# Patient Record
Sex: FEMALE | Race: WHITE
[De-identification: names, ages, dates, MRNs, and addresses within clinical notes are randomized per-mention and may not be internally consistent; named-entity substitution may affect disease eponyms.]

---
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
## 2021-03-03 LAB
ABO GROUP BLD: NORMAL
BLD GP AB SCN SERPL QL: NORMAL
RH BLD: NORMAL
TREPONEMA PALLIDUM IGG+IGM AB [PRESENCE] IN SERUM OR PLASMA BY IMMUNOASSAY: NONREACTIVE

## 2021-06-09 ENCOUNTER — HOSPITAL ENCOUNTER (INPATIENT)
Facility: MEDICAL CENTER | Age: 21
LOS: 2 days | End: 2021-06-11
Attending: OBSTETRICS & GYNECOLOGY | Admitting: OBSTETRICS & GYNECOLOGY
Payer: COMMERCIAL

## 2021-06-09 LAB
ALBUMIN SERPL BCP-MCNC: 3.4 G/DL (ref 3.2–4.9)
ALBUMIN/GLOB SERPL: 1.3 G/DL
ALP SERPL-CCNC: 179 U/L (ref 30–99)
ALT SERPL-CCNC: 12 U/L (ref 2–50)
ANION GAP SERPL CALC-SCNC: 11 MMOL/L (ref 7–16)
AST SERPL-CCNC: 16 U/L (ref 12–45)
BASOPHILS # BLD AUTO: 0.2 % (ref 0–1.8)
BASOPHILS # BLD: 0.02 K/UL (ref 0–0.12)
BILIRUB SERPL-MCNC: 0.2 MG/DL (ref 0.1–1.5)
BUN SERPL-MCNC: 3 MG/DL (ref 8–22)
CALCIUM SERPL-MCNC: 8.6 MG/DL (ref 8.5–10.5)
CHLORIDE SERPL-SCNC: 107 MMOL/L (ref 96–112)
CO2 SERPL-SCNC: 19 MMOL/L (ref 20–33)
CREAT SERPL-MCNC: 0.35 MG/DL (ref 0.5–1.4)
CREAT UR-MCNC: 24.56 MG/DL
EOSINOPHIL # BLD AUTO: 0.07 K/UL (ref 0–0.51)
EOSINOPHIL NFR BLD: 0.8 % (ref 0–6.9)
ERYTHROCYTE [DISTWIDTH] IN BLOOD BY AUTOMATED COUNT: 46.5 FL (ref 35.9–50)
GLOBULIN SER CALC-MCNC: 2.6 G/DL (ref 1.9–3.5)
GLUCOSE SERPL-MCNC: 70 MG/DL (ref 65–99)
HCT VFR BLD AUTO: 34.6 % (ref 37–47)
HGB BLD-MCNC: 10.6 G/DL (ref 12–16)
HOLDING TUBE BB 8507: NORMAL
IMM GRANULOCYTES # BLD AUTO: 0.05 K/UL (ref 0–0.11)
IMM GRANULOCYTES NFR BLD AUTO: 0.6 % (ref 0–0.9)
LYMPHOCYTES # BLD AUTO: 1.53 K/UL (ref 1–4.8)
LYMPHOCYTES NFR BLD: 17.2 % (ref 22–41)
MCH RBC QN AUTO: 26 PG (ref 27–33)
MCHC RBC AUTO-ENTMCNC: 30.6 G/DL (ref 33.6–35)
MCV RBC AUTO: 85 FL (ref 81.4–97.8)
MONOCYTES # BLD AUTO: 0.7 K/UL (ref 0–0.85)
MONOCYTES NFR BLD AUTO: 7.9 % (ref 0–13.4)
NEUTROPHILS # BLD AUTO: 6.54 K/UL (ref 2–7.15)
NEUTROPHILS NFR BLD: 73.3 % (ref 44–72)
NRBC # BLD AUTO: 0 K/UL
NRBC BLD-RTO: 0 /100 WBC
PLATELET # BLD AUTO: 288 K/UL (ref 164–446)
PMV BLD AUTO: 12.6 FL (ref 9–12.9)
POTASSIUM SERPL-SCNC: 3.9 MMOL/L (ref 3.6–5.5)
PROT SERPL-MCNC: 6 G/DL (ref 6–8.2)
PROT UR-MCNC: 6 MG/DL (ref 0–15)
PROT/CREAT UR: 244 MG/G (ref 10–107)
RBC # BLD AUTO: 4.07 M/UL (ref 4.2–5.4)
SODIUM SERPL-SCNC: 137 MMOL/L (ref 135–145)
URATE SERPL-MCNC: 4 MG/DL (ref 1.9–8.2)
WBC # BLD AUTO: 8.9 K/UL (ref 4.8–10.8)

## 2021-06-09 PROCEDURE — 700105 HCHG RX REV CODE 258: Performed by: OBSTETRICS & GYNECOLOGY

## 2021-06-09 PROCEDURE — 36415 COLL VENOUS BLD VENIPUNCTURE: CPT

## 2021-06-09 PROCEDURE — 80053 COMPREHEN METABOLIC PANEL: CPT

## 2021-06-09 PROCEDURE — A9270 NON-COVERED ITEM OR SERVICE: HCPCS | Performed by: OBSTETRICS & GYNECOLOGY

## 2021-06-09 PROCEDURE — U0003 INFECTIOUS AGENT DETECTION BY NUCLEIC ACID (DNA OR RNA); SEVERE ACUTE RESPIRATORY SYNDROME CORONAVIRUS 2 (SARS-COV-2) (CORONAVIRUS DISEASE [COVID-19]), AMPLIFIED PROBE TECHNIQUE, MAKING USE OF HIGH THROUGHPUT TECHNOLOGIES AS DESCRIBED BY CMS-2020-01-R: HCPCS

## 2021-06-09 PROCEDURE — 302449 STATCHG TRIAGE ONLY (STATISTIC)

## 2021-06-09 PROCEDURE — 85025 COMPLETE CBC W/AUTO DIFF WBC: CPT

## 2021-06-09 PROCEDURE — 770002 HCHG ROOM/CARE - OB PRIVATE (112)

## 2021-06-09 PROCEDURE — C9803 HOPD COVID-19 SPEC COLLECT: HCPCS | Performed by: OBSTETRICS & GYNECOLOGY

## 2021-06-09 PROCEDURE — 59025 FETAL NON-STRESS TEST: CPT

## 2021-06-09 PROCEDURE — 84550 ASSAY OF BLOOD/URIC ACID: CPT

## 2021-06-09 PROCEDURE — U0005 INFEC AGEN DETEC AMPLI PROBE: HCPCS

## 2021-06-09 PROCEDURE — 87400 INFLUENZA A/B EACH AG IA: CPT

## 2021-06-09 PROCEDURE — 82570 ASSAY OF URINE CREATININE: CPT

## 2021-06-09 PROCEDURE — 84156 ASSAY OF PROTEIN URINE: CPT

## 2021-06-09 PROCEDURE — 700102 HCHG RX REV CODE 250 W/ 637 OVERRIDE(OP): Performed by: OBSTETRICS & GYNECOLOGY

## 2021-06-09 PROCEDURE — 87426 SARSCOV CORONAVIRUS AG IA: CPT

## 2021-06-09 PROCEDURE — 3E033VJ INTRODUCTION OF OTHER HORMONE INTO PERIPHERAL VEIN, PERCUTANEOUS APPROACH: ICD-10-PCS | Performed by: OBSTETRICS & GYNECOLOGY

## 2021-06-09 RX ORDER — ACETAMINOPHEN 500 MG
1000 TABLET ORAL ONCE
Status: COMPLETED | OUTPATIENT
Start: 2021-06-09 | End: 2021-06-09

## 2021-06-09 RX ORDER — SODIUM CHLORIDE, SODIUM LACTATE, POTASSIUM CHLORIDE, CALCIUM CHLORIDE 600; 310; 30; 20 MG/100ML; MG/100ML; MG/100ML; MG/100ML
INJECTION, SOLUTION INTRAVENOUS CONTINUOUS
Status: ACTIVE | OUTPATIENT
Start: 2021-06-09 | End: 2021-06-09

## 2021-06-09 RX ORDER — ALUMINA, MAGNESIA, AND SIMETHICONE 2400; 2400; 240 MG/30ML; MG/30ML; MG/30ML
30 SUSPENSION ORAL EVERY 6 HOURS PRN
Status: DISCONTINUED | OUTPATIENT
Start: 2021-06-09 | End: 2021-06-10 | Stop reason: HOSPADM

## 2021-06-09 RX ORDER — MISOPROSTOL 200 UG/1
800 TABLET ORAL
Status: DISCONTINUED | OUTPATIENT
Start: 2021-06-09 | End: 2021-06-10 | Stop reason: HOSPADM

## 2021-06-09 RX ORDER — DEXTROSE, SODIUM CHLORIDE, SODIUM LACTATE, POTASSIUM CHLORIDE, AND CALCIUM CHLORIDE 5; .6; .31; .03; .02 G/100ML; G/100ML; G/100ML; G/100ML; G/100ML
INJECTION, SOLUTION INTRAVENOUS CONTINUOUS
Status: DISCONTINUED | OUTPATIENT
Start: 2021-06-09 | End: 2021-06-11

## 2021-06-09 RX ADMIN — ACETAMINOPHEN 1000 MG: 500 TABLET ORAL at 12:21

## 2021-06-09 RX ADMIN — MISOPROSTOL 25 MCG: 100 TABLET ORAL at 15:14

## 2021-06-09 RX ADMIN — SODIUM CHLORIDE, POTASSIUM CHLORIDE, SODIUM LACTATE AND CALCIUM CHLORIDE: 600; 310; 30; 20 INJECTION, SOLUTION INTRAVENOUS at 14:37

## 2021-06-09 ASSESSMENT — LIFESTYLE VARIABLES
ALCOHOL_USE: NO
AVERAGE NUMBER OF DAYS PER WEEK YOU HAVE A DRINK CONTAINING ALCOHOL: 0
HAVE YOU EVER FELT YOU SHOULD CUT DOWN ON YOUR DRINKING: NO
HOW MANY TIMES IN THE PAST YEAR HAVE YOU HAD 5 OR MORE DRINKS IN A DAY: 0
EVER_SMOKED: NEVER
TOTAL SCORE: 0
EVER FELT BAD OR GUILTY ABOUT YOUR DRINKING: NO
TOTAL SCORE: 0
CONSUMPTION TOTAL: NEGATIVE
EVER HAD A DRINK FIRST THING IN THE MORNING TO STEADY YOUR NERVES TO GET RID OF A HANGOVER: NO
HAVE PEOPLE ANNOYED YOU BY CRITICIZING YOUR DRINKING: NO
TOTAL SCORE: 0
ON A TYPICAL DAY WHEN YOU DRINK ALCOHOL HOW MANY DRINKS DO YOU HAVE: 0

## 2021-06-09 ASSESSMENT — PATIENT HEALTH QUESTIONNAIRE - PHQ9
1. LITTLE INTEREST OR PLEASURE IN DOING THINGS: NOT AT ALL
2. FEELING DOWN, DEPRESSED, IRRITABLE, OR HOPELESS: NOT AT ALL
SUM OF ALL RESPONSES TO PHQ9 QUESTIONS 1 AND 2: 0

## 2021-06-09 ASSESSMENT — PAIN SCALES - GENERAL: PAINLEVEL: 5 - MODERATE PAIN

## 2021-06-09 ASSESSMENT — PAIN DESCRIPTION - PAIN TYPE: TYPE: ACUTE PAIN

## 2021-06-09 NOTE — CARE PLAN
Problem: Knowledge Deficit - L&D  Goal: Patient and family/caregivers will demonstrate understanding of plan of care, disease process/condition, diagnostic tests and medications  Outcome: Progressing     Problem: Psychosocial - L&D  Goal: Patient's level of anxiety will decrease  Outcome: Progressing  Goal: Patient will be able to discuss coping skills during hospitalization  Outcome: Progressing  Goal: Patient's ability to re-evaluate and adapt role responsibilities will improve  Outcome: Progressing  Goal: Spiritual and cultural needs incorporated into hospitalization  Outcome: Progressing     Problem: Risk for Venous Thromboembolism (VTE)  Goal: VTE prevention measures will be implemented and patient will remain free from VTE  Outcome: Progressing     Problem: Risk for Infection and Impaired Wound Healing  Goal: Patient will remain free from infection  Outcome: Progressing  Goal: Patient's wound/surgical incision will decrease in size and heals properly  Outcome: Progressing     Problem: Risk for Fluid Imbalance  Goal: Patient's fluid volume balance will be maintained or improve  Outcome: Progressing     Problem: Risk for Injury  Goal: Patient and fetus will be free of preventable injury/complications  Outcome: Progressing     Problem: Pain  Goal: Patient's pain will be alleviated or reduced to the patient’s comfort goal  Outcome: Progressing     Problem: Discharge Barriers/Planning  Goal: Patient's continuum of care needs are met  Outcome: Progressing   The patient is Stable - Low risk of patient condition declining or worsening         Progress made toward(s) clinical / shift goals:  pain controlled well    Patient is not progressing towards the following goals:       Past Medical History:   Diagnosis Date    Arthritis     Asthma     CHF (congestive heart failure)     COPD (chronic obstructive pulmonary disease)     Depression     Hyperlipemia     Hypertension     Leaky heart valve     Obese     Rheumatoid arthritis(714.0)     Stroke        Past Surgical History:   Procedure Laterality Date    CAROTID STENT      3 years ago    CHOLECYSTECTOMY      HYSTERECTOMY      SLEEVE GASTROPLASTY  02/21/2017       Review of patient's allergies indicates:  No Known Allergies    No current facility-administered medications on file prior to encounter.      Current Outpatient Medications on File Prior to Encounter   Medication Sig    albuterol 90 mcg/actuation inhaler Inhale 2 puffs into the lungs 4 (four) times daily.    aspirin (ECOTRIN) 325 MG EC tablet Take 1 tablet (325 mg total) by mouth once daily.    atorvastatin (LIPITOR) 40 MG tablet Take 1 tablet (40 mg total) by mouth once daily.    b complex vitamins tablet Take 1 tablet by mouth once daily.    calcium citrate-vitamin D3 315-200 mg (CITRACAL+D) 315-200 mg-unit per tablet Take 1 tablet by mouth once daily.     cyanocobalamin (VITAMIN B-12) 1000 MCG tablet Take 1,000 mcg by mouth once daily.     ferrous sulfate 325 (65 FE) MG EC tablet Take 325 mg by mouth once daily.     FLOVENT  mcg/actuation inhaler TAKE 1 PUFF BY MOUTH TWICE DAILY (Patient taking differently: Inhale 1 puff into the lungs every 12 (twelve) hours. )    fluticasone (FLONASE) 50 mcg/actuation nasal spray 2 sprays by Each Nare route once daily.    hydrALAZINE (APRESOLINE) 25 MG tablet Take 1 tablet (25 mg total) by mouth every 12 (twelve) hours.    isosorbide dinitrate (ISORDIL) 5 MG Tab Take 1 tablet (5 mg total) by mouth 3 (three) times daily.    ketoconazole (NIZORAL) 2 % cream Apply topically once daily. (Patient taking differently: Apply 1 application topically once daily. )    metOLazone (ZAROXOLYN) 2.5 MG tablet Take 1 tablet  (2.5 mg total) by mouth once daily.    metoprolol succinate (TOPROL-XL) 50 MG 24 hr tablet Take 1 tablet (50 mg total) by mouth once daily.    montelukast (SINGULAIR) 10 mg tablet Take 1 tablet (10 mg total) by mouth every evening.    multivitamin (THERAGRAN) per tablet Take 1 tablet by mouth once daily.    nystatin (MYCOSTATIN) powder Apply topically 2 (two) times daily.    pantoprazole (PROTONIX) 40 MG tablet Take 1 tablet (40 mg total) by mouth once daily.    amlodipine (NORVASC) 10 MG tablet Take 1 tablet (10 mg total) by mouth once daily.    [DISCONTINUED] furosemide (LASIX) 20 MG tablet Take 1 tablet (20 mg total) by mouth once daily.     Family History     Problem Relation (Age of Onset)    Arthritis Mother    Asthma Son    Cancer Mother    Diabetes Mother    Heart disease Father    Hyperlipidemia Mother    Hypertension Mother, Father, Sister, Sister    Stroke Mother        Tobacco Use    Smoking status: Never Smoker    Smokeless tobacco: Never Used   Substance and Sexual Activity    Alcohol use: No    Drug use: No    Sexual activity: Not on file     Review of Systems   Constitutional: Positive for fatigue. Negative for chills, diaphoresis, fever and unexpected weight change.   HENT: Negative for sore throat, tinnitus, trouble swallowing and voice change.    Eyes: Negative for visual disturbance.   Respiratory: Negative for cough, choking, chest tightness, shortness of breath and wheezing.    Cardiovascular: Negative for chest pain, palpitations and leg swelling.   Gastrointestinal: Negative for abdominal pain, blood in stool, constipation, diarrhea, nausea and vomiting.   Genitourinary: Negative for difficulty urinating, dysuria, flank pain, frequency, hematuria, urgency, vaginal bleeding and vaginal discharge.   Musculoskeletal: Negative for gait problem, myalgias, neck pain and neck stiffness.   Skin: Negative for rash and wound.   Neurological: Positive for dizziness, speech difficulty and  weakness. Negative for tremors, syncope, facial asymmetry, light-headedness, numbness and headaches.   Psychiatric/Behavioral: Negative for confusion and sleep disturbance. The patient is not nervous/anxious.      Objective:     Vital Signs (Most Recent):  Temp: 98.5 °F (36.9 °C) (10/05/19 2100)  Pulse: (!) 54 (10/05/19 2100)  Resp: 18 (10/05/19 2100)  BP: (!) 141/70 (10/05/19 2100)  SpO2: 97 % (10/05/19 2100) Vital Signs (24h Range):  Temp:  [97.7 °F (36.5 °C)-98.5 °F (36.9 °C)] 98.5 °F (36.9 °C)  Pulse:  [49-64] 54  Resp:  [14-21] 18  SpO2:  [86 %-100 %] 97 %  BP: ()/(51-83) 141/70     Weight: 88.5 kg (195 lb)  Body mass index is 34.54 kg/m².    Physical Exam   Constitutional: She is oriented to person, place, and time. She appears well-developed and well-nourished. She appears lethargic.   HENT:   Head: Normocephalic and atraumatic.   Eyes: Pupils are equal, round, and reactive to light. Conjunctivae, EOM and lids are normal.   Neck: Trachea normal and normal range of motion. Neck supple.   Cardiovascular: Regular rhythm, S1 normal, S2 normal, normal heart sounds, intact distal pulses and normal pulses. Bradycardia present.   Pulmonary/Chest: Effort normal and breath sounds normal.   Abdominal: Soft. Normal appearance and bowel sounds are normal.   Neurological: She is oriented to person, place, and time. She has normal strength. She appears lethargic. GCS eye subscore is 4. GCS verbal subscore is 5. GCS motor subscore is 6.   Skin: Skin is warm, dry and intact. Capillary refill takes less than 2 seconds.   Psychiatric: She has a normal mood and affect. Her speech is delayed. She is slowed.         CRANIAL NERVES     CN III, IV, VI   Pupils are equal, round, and reactive to light.  Extraocular motions are normal.        Significant Labs:   CBC:   Recent Labs   Lab 10/05/19  1355   WBC 7.48   HGB 12.8   HCT 40.6        CMP:   Recent Labs   Lab 10/05/19  1505      K 4.5      CO2 26   GLU  114*   BUN 43*   CREATININE 1.9*   CALCIUM 9.3   PROT 7.8   ALBUMIN 3.7   BILITOT 0.8   ALKPHOS 56   AST 13   ALT 11   ANIONGAP 9   EGFRNONAA 29.3*     Cardiac Markers:   Recent Labs   Lab 10/05/19  1355   BNP 25     Magnesium:   Recent Labs   Lab 10/05/19  1505   MG 1.8     Troponin:   Recent Labs   Lab 10/05/19  1505 10/05/19  2133   TROPONINI <0.030 <0.030     Urine Studies:   Recent Labs   Lab 10/05/19  1411   COLORU Yellow   APPEARANCEUA Clear   PHUR 6.0   SPECGRAV 1.010   PROTEINUA Negative   GLUCUA Negative   KETONESU Negative   BILIRUBINUA Negative   OCCULTUA Negative   NITRITE Negative   UROBILINOGEN Negative   LEUKOCYTESUR Negative       Significant Imaging: I have reviewed all pertinent imaging results/findings within the past 24 hours.

## 2021-06-09 NOTE — PROGRESS NOTES
Presents at 39.1 wk from office for PIH work up. Reports h/a unrelieved by tylenol since last week but denies visual changes or right epigastric pain; reports swelling in her feet and hands. Denies UCs, LOF, or VB; reports good FM. Serial BPs running. 1+ edema in ankles and feet; 2+ reflexes and no clonus.     1220: Report given to Dr. Pimentel; orders to give tylenol and more po fluids to see if h/a goes away    1300: H/a better. BP when pt sitting up with feet dangling 136/90. Update given to Dr. Pimentel; admit orders obtained. SVE 1-2/70/-2    1400: Transferred to labor room. Report given to Komal GAMA

## 2021-06-09 NOTE — PROGRESS NOTES
1400)Report received, care assumed  1415) IV start   1420) Dr Pimentel at BS interviewing pt, POC discussed  1900) Report to Zuly GAMA

## 2021-06-10 ENCOUNTER — ANESTHESIA EVENT (OUTPATIENT)
Dept: ANESTHESIOLOGY | Facility: MEDICAL CENTER | Age: 21
End: 2021-06-10
Payer: COMMERCIAL

## 2021-06-10 ENCOUNTER — ANESTHESIA (OUTPATIENT)
Dept: ANESTHESIOLOGY | Facility: MEDICAL CENTER | Age: 21
End: 2021-06-10
Payer: COMMERCIAL

## 2021-06-10 LAB
FLUAV AG SPEC QL IA: NEGATIVE
FLUBV AG SPEC QL IA: NEGATIVE
SARS-COV+SARS-COV-2 AG RESP QL IA.RAPID: NOTDETECTED
SARS-COV-2 RNA RESP QL NAA+PROBE: NOTDETECTED
SPECIMEN SOURCE: NORMAL
SPECIMEN SOURCE: NORMAL

## 2021-06-10 PROCEDURE — 700111 HCHG RX REV CODE 636 W/ 250 OVERRIDE (IP): Performed by: ANESTHESIOLOGY

## 2021-06-10 PROCEDURE — 700105 HCHG RX REV CODE 258: Performed by: OBSTETRICS & GYNECOLOGY

## 2021-06-10 PROCEDURE — 700111 HCHG RX REV CODE 636 W/ 250 OVERRIDE (IP)

## 2021-06-10 PROCEDURE — 10H07YZ INSERTION OF OTHER DEVICE INTO PRODUCTS OF CONCEPTION, VIA NATURAL OR ARTIFICIAL OPENING: ICD-10-PCS | Performed by: OBSTETRICS & GYNECOLOGY

## 2021-06-10 PROCEDURE — 59409 OBSTETRICAL CARE: CPT

## 2021-06-10 PROCEDURE — 770002 HCHG ROOM/CARE - OB PRIVATE (112)

## 2021-06-10 PROCEDURE — 700111 HCHG RX REV CODE 636 W/ 250 OVERRIDE (IP): Performed by: OBSTETRICS & GYNECOLOGY

## 2021-06-10 PROCEDURE — 304965 HCHG RECOVERY SERVICES

## 2021-06-10 PROCEDURE — 700105 HCHG RX REV CODE 258: Performed by: ANESTHESIOLOGY

## 2021-06-10 PROCEDURE — 303615 HCHG EPIDURAL/SPINAL ANESTHESIA FOR LABOR

## 2021-06-10 PROCEDURE — 0UQGXZZ REPAIR VAGINA, EXTERNAL APPROACH: ICD-10-PCS | Performed by: OBSTETRICS & GYNECOLOGY

## 2021-06-10 RX ORDER — ONDANSETRON 2 MG/ML
4 INJECTION INTRAMUSCULAR; INTRAVENOUS EVERY 6 HOURS PRN
Status: DISCONTINUED | OUTPATIENT
Start: 2021-06-10 | End: 2021-06-11 | Stop reason: HOSPADM

## 2021-06-10 RX ORDER — OXYCODONE HYDROCHLORIDE AND ACETAMINOPHEN 5; 325 MG/1; MG/1
1 TABLET ORAL EVERY 4 HOURS PRN
Status: DISCONTINUED | OUTPATIENT
Start: 2021-06-10 | End: 2021-06-11 | Stop reason: HOSPADM

## 2021-06-10 RX ORDER — SODIUM CHLORIDE, SODIUM LACTATE, POTASSIUM CHLORIDE, AND CALCIUM CHLORIDE .6; .31; .03; .02 G/100ML; G/100ML; G/100ML; G/100ML
1000 INJECTION, SOLUTION INTRAVENOUS
Status: COMPLETED | OUTPATIENT
Start: 2021-06-10 | End: 2021-06-10

## 2021-06-10 RX ORDER — DOCUSATE SODIUM 100 MG/1
100 CAPSULE, LIQUID FILLED ORAL 2 TIMES DAILY PRN
Status: DISCONTINUED | OUTPATIENT
Start: 2021-06-10 | End: 2021-06-11 | Stop reason: HOSPADM

## 2021-06-10 RX ORDER — SODIUM CHLORIDE, SODIUM LACTATE, POTASSIUM CHLORIDE, AND CALCIUM CHLORIDE .6; .31; .03; .02 G/100ML; G/100ML; G/100ML; G/100ML
250 INJECTION, SOLUTION INTRAVENOUS PRN
Status: DISCONTINUED | OUTPATIENT
Start: 2021-06-10 | End: 2021-06-10 | Stop reason: HOSPADM

## 2021-06-10 RX ORDER — CARBOPROST TROMETHAMINE 250 UG/ML
250 INJECTION, SOLUTION INTRAMUSCULAR
Status: DISCONTINUED | OUTPATIENT
Start: 2021-06-10 | End: 2021-06-11 | Stop reason: HOSPADM

## 2021-06-10 RX ORDER — BUPIVACAINE HYDROCHLORIDE 2.5 MG/ML
INJECTION, SOLUTION EPIDURAL; INFILTRATION; INTRACAUDAL PRN
Status: DISCONTINUED | OUTPATIENT
Start: 2021-06-10 | End: 2021-06-10 | Stop reason: SURG

## 2021-06-10 RX ORDER — ROPIVACAINE HYDROCHLORIDE 2 MG/ML
INJECTION, SOLUTION EPIDURAL; INFILTRATION; PERINEURAL
Status: COMPLETED
Start: 2021-06-10 | End: 2021-06-10

## 2021-06-10 RX ORDER — SODIUM CHLORIDE, SODIUM LACTATE, POTASSIUM CHLORIDE, CALCIUM CHLORIDE 600; 310; 30; 20 MG/100ML; MG/100ML; MG/100ML; MG/100ML
INJECTION, SOLUTION INTRAVENOUS PRN
Status: DISCONTINUED | OUTPATIENT
Start: 2021-06-10 | End: 2021-06-11 | Stop reason: HOSPADM

## 2021-06-10 RX ORDER — ONDANSETRON 4 MG/1
4 TABLET, ORALLY DISINTEGRATING ORAL EVERY 6 HOURS PRN
Status: DISCONTINUED | OUTPATIENT
Start: 2021-06-10 | End: 2021-06-11 | Stop reason: HOSPADM

## 2021-06-10 RX ORDER — BISACODYL 10 MG
10 SUPPOSITORY, RECTAL RECTAL PRN
Status: DISCONTINUED | OUTPATIENT
Start: 2021-06-10 | End: 2021-06-11 | Stop reason: HOSPADM

## 2021-06-10 RX ORDER — ROPIVACAINE HYDROCHLORIDE 2 MG/ML
INJECTION, SOLUTION EPIDURAL; INFILTRATION; PERINEURAL CONTINUOUS
Status: DISCONTINUED | OUTPATIENT
Start: 2021-06-10 | End: 2021-06-11

## 2021-06-10 RX ORDER — IBUPROFEN 600 MG/1
600 TABLET ORAL EVERY 6 HOURS PRN
Status: DISCONTINUED | OUTPATIENT
Start: 2021-06-10 | End: 2021-06-11 | Stop reason: HOSPADM

## 2021-06-10 RX ORDER — MISOPROSTOL 200 UG/1
600 TABLET ORAL
Status: DISCONTINUED | OUTPATIENT
Start: 2021-06-10 | End: 2021-06-11 | Stop reason: HOSPADM

## 2021-06-10 RX ORDER — OXYCODONE HYDROCHLORIDE AND ACETAMINOPHEN 5; 325 MG/1; MG/1
2 TABLET ORAL EVERY 4 HOURS PRN
Status: DISCONTINUED | OUTPATIENT
Start: 2021-06-10 | End: 2021-06-11 | Stop reason: HOSPADM

## 2021-06-10 RX ADMIN — SODIUM CHLORIDE, SODIUM LACTATE, POTASSIUM CHLORIDE, CALCIUM CHLORIDE AND DEXTROSE MONOHYDRATE: 5; 600; 310; 30; 20 INJECTION, SOLUTION INTRAVENOUS at 10:19

## 2021-06-10 RX ADMIN — FENTANYL CITRATE 100 MCG: 50 INJECTION, SOLUTION INTRAMUSCULAR; INTRAVENOUS at 05:14

## 2021-06-10 RX ADMIN — OXYTOCIN 2 MILLI-UNITS/MIN: 10 INJECTION, SOLUTION INTRAMUSCULAR; INTRAVENOUS at 01:40

## 2021-06-10 RX ADMIN — ROPIVACAINE HYDROCHLORIDE: 2 INJECTION, SOLUTION EPIDURAL; INFILTRATION at 05:48

## 2021-06-10 RX ADMIN — SODIUM CHLORIDE, POTASSIUM CHLORIDE, SODIUM LACTATE AND CALCIUM CHLORIDE 1000 ML: 600; 310; 30; 20 INJECTION, SOLUTION INTRAVENOUS at 05:05

## 2021-06-10 RX ADMIN — SODIUM CHLORIDE, SODIUM LACTATE, POTASSIUM CHLORIDE, CALCIUM CHLORIDE AND DEXTROSE MONOHYDRATE 125 ML: 5; 600; 310; 30; 20 INJECTION, SOLUTION INTRAVENOUS at 01:39

## 2021-06-10 RX ADMIN — ROPIVACAINE HYDROCHLORIDE: 2 INJECTION, SOLUTION EPIDURAL; INFILTRATION at 12:10

## 2021-06-10 RX ADMIN — FENTANYL CITRATE 50 MCG: 50 INJECTION, SOLUTION INTRAMUSCULAR; INTRAVENOUS at 01:51

## 2021-06-10 RX ADMIN — BUPIVACAINE HYDROCHLORIDE 5 ML: 2.5 INJECTION, SOLUTION EPIDURAL; INFILTRATION; INTRACAUDAL; PERINEURAL at 05:52

## 2021-06-10 ASSESSMENT — PAIN DESCRIPTION - PAIN TYPE
TYPE: ACUTE PAIN

## 2021-06-10 ASSESSMENT — PAIN SCALES - GENERAL: PAIN_LEVEL: 0

## 2021-06-10 NOTE — PROGRESS NOTES
"Labor and Delivery     S: Comfortable with epidural. Feeling pressure with CTX, but not between yet     O: /69   Pulse 95   Temp 36.5 °C (97.7 °F) (Temporal)   Resp 18   Ht 1.6 m (5' 3\")   Wt 85.7 kg (189 lb)   SpO2 96%   Gen: NAD  SVE: 6-7/90-0 per RN @ 1100   [place]: CTX q2-5  FHT: Baseline 120 with moderate LTV. +accels. No variables/decels     Labs: Hgb 10.6, Plt 288  RH+, GBS neg, RI     A/P 22yo  @ 39w2d, IOL for GHTN  1. Labor           -Progressing well                           -Continue pitocin  2. FWB            -Cat I  3. Pain             -Controlled with epidural  4. GBS neg  5. GHTN          -No current s/sx of PreE  "

## 2021-06-10 NOTE — ANESTHESIA PREPROCEDURE EVALUATION
G1 @ 39w2d, IUP, Tan  Elevated BP  Relevant Problems   No relevant active problems       Physical Exam    Airway   Mallampati: II  TM distance: >3 FB  Neck ROM: full       Cardiovascular - normal exam  Rhythm: regular  Rate: normal  (-) murmur     Dental - normal exam           Pulmonary - normal exam  Breath sounds clear to auscultation     Abdominal    Neurological - normal exam                 Anesthesia Plan    ASA 2       Plan - epidural   Neuraxial block will be labor analgesia                  Pertinent diagnostic labs and testing reviewed    Informed Consent:    Anesthetic plan and risks discussed with patient.

## 2021-06-10 NOTE — ANESTHESIA PROCEDURE NOTES
Epidural Block    Date/Time: 6/10/2021 5:46 AM  Performed by: Paolo Knox D.O.  Authorized by: Paolo Knox D.O.     Patient Location:  OB  Start Time:  6/10/2021 5:46 AM  End Time:  6/10/2021 5:52 AM  Reason for Block: labor analgesia    patient identified, IV checked, site marked, risks and benefits discussed, surgical consent, monitors and equipment checked, pre-op evaluation and timeout performed    Patient Position:  Sitting  Prep: ChloraPrep, patient draped and sterile technique    Monitoring:  Blood pressure, continuous pulse oximetry and heart rate  Approach:  Midline  Location:  L3-L4  Injection Technique:  ALBA air  Skin infiltration:  Lidocaine  Strength:  1%  Dose:  3ml  Needle Type:  Tuohy  Needle Gauge:  17 G  Needle Length:  3.5 in  Loss of resistance::  5  Catheter Size:  19 G  Catheter at Skin Depth:  9  Test Dose Result:  Negative

## 2021-06-10 NOTE — CARE PLAN
Problem: Knowledge Deficit - L&D  Goal: Patient and family/caregivers will demonstrate understanding of plan of care, disease process/condition, diagnostic tests and medications  6/10/2021 0943 by Komal Ceballos R.N.  Outcome: Progressing  6/10/2021 0943 by Komal Ceballos R.N.  Outcome: Progressing     Problem: Psychosocial - L&D  Goal: Patient's level of anxiety will decrease  6/10/2021 0943 by Komal Ceballos R.N.  Outcome: Progressing  6/10/2021 0943 by Komal Ceballos R.N.  Outcome: Progressing  Goal: Patient will be able to discuss coping skills during hospitalization  6/10/2021 0943 by Komal Ceballos R.N.  Outcome: Progressing  6/10/2021 0943 by Komal Ceballos R.N.  Outcome: Progressing  Goal: Patient's ability to re-evaluate and adapt role responsibilities will improve  6/10/2021 0943 by Komal Ceballos R.N.  Outcome: Progressing  6/10/2021 0943 by Komal Ceballos R.N.  Outcome: Progressing  Goal: Spiritual and cultural needs incorporated into hospitalization  6/10/2021 0943 by Komal Ceballos R.N.  Outcome: Progressing  6/10/2021 0943 by Komal Ceballos R.N.  Outcome: Progressing     Problem: Risk for Venous Thromboembolism (VTE)  Goal: VTE prevention measures will be implemented and patient will remain free from VTE  6/10/2021 0943 by Komal Ceballos R.N.  Outcome: Progressing  6/10/2021 0943 by Komal Ceballos R.N.  Outcome: Progressing     Problem: Risk for Infection and Impaired Wound Healing  Goal: Patient will remain free from infection  6/10/2021 0943 by Komal Ceballos R.N.  Outcome: Progressing  6/10/2021 0943 by Komal Ceballos R.N.  Outcome: Progressing  Goal: Patient's wound/surgical incision will decrease in size and heals properly  6/10/2021 0943 by Komal Ceballos R.N.  Outcome: Progressing  6/10/2021 0943 by Komal Ceballos R.N.  Outcome: Progressing     Problem: Risk for Fluid Imbalance  Goal: Patient's fluid volume balance will be  maintained or improve  6/10/2021 0943 by Komal Ceballos RJOJO.  Outcome: Progressing  6/10/2021 0943 by Komal Ceballos R.N.  Outcome: Progressing     Problem: Risk for Injury  Goal: Patient and fetus will be free of preventable injury/complications  6/10/2021 0943 by Komal Ceballos R.N.  Outcome: Progressing  6/10/2021 0943 by Komal Ceballos R.N.  Outcome: Progressing     Problem: Pain  Goal: Patient's pain will be alleviated or reduced to the patient’s comfort goal  6/10/2021 0943 by Komal Ceballos R.N.  Outcome: Progressing  6/10/2021 0943 by Komal Ceballos R.N.  Outcome: Progressing     Problem: Discharge Barriers/Planning  Goal: Patient's continuum of care needs are met  6/10/2021 0943 by Komal Ceballos R.N.  Outcome: Progressing  6/10/2021 0943 by KACEY MckeonN.  Outcome: Progressing   The patient is Stable - Low risk of patient condition declining or worsening         Progress made toward(s) clinical / shift goals:      Patient is not progressing towards the following goals:

## 2021-06-10 NOTE — CARE PLAN
The patient is Stable - Low risk of patient condition declining or worsening         Progress made toward(s) clinical / shift goals:    Problem: Risk for Injury  Goal: Patient and fetus will be free of preventable injury/complications  Note: Goal for pt. And fetus to remain injury free during admission.      Problem: Pain  Goal: Patient's pain will be alleviated or reduced to the patient’s comfort goal  Note: Pt. Comfortable with epidural. Goal for pain to remain at manageable level.

## 2021-06-10 NOTE — PROGRESS NOTES
"Labor and Delivery    S: Comfortable with epidural.     O: /79   Pulse (!) 121   Temp 36.1 °C (96.9 °F) (Temporal)   Resp (!) 22   Ht 1.6 m (5' 3\")   Wt 85.7 kg (189 lb)   SpO2 96%   BP Range: 101-132/59-82  Gen: NAD  SVE: 4/90/-2  IUPC placed  [place]: CTX q2-5  FHT: Baseline 130 with moderate LTV. +accels. No variables/decels    Labs: Hgb 10.6, Plt 288  RH+, GBS neg, RI    A/P 20yo  @ 39w2d, IOL for GHTN  1. Labor -Remains latent    -Continue pitocin  2. FWB -Cat I  3. Pain  -Controlled with epidural  4. GBS neg  5. GHTN -No current s/sx of PreE  "

## 2021-06-10 NOTE — FLOWSHEET NOTE
0250: Report from NATAN Caruso. Care assumed.  0545: MD Lisette in room for epidural placement   0550: Test dose given   0551: Test dose negative. Catheter taped. Epidural pump initiated. Pt. Education provided  0655: BSR given to NATAN Sauceda. All cares relinquished at this time.

## 2021-06-10 NOTE — PROGRESS NOTES
Labor Progress Note    Kathia Zarco   39w1d      Subjective:  Still not feeling many contractions  Objective:   Vitals:    06/09/21 1620 06/09/21 1730 06/09/21 1902 06/09/21 2125   BP: 131/82 120/73 130/85 132/89   Pulse: (!) 106 (!) 106 100 98   Resp: 18  16    Temp:   36.4 °C (97.5 °F)    TempSrc:   Temporal    SpO2:       Weight:       Height:         SVE: 3/80/-2  Arom clear  toco q2  FHT cat 1,  Reactive, moderate variability, no  decels  Ext : no calf pain saloni le  Labs:  Recent Results (from the past 24 hour(s))   PROTEIN/CREAT RATIO URINE    Collection Time: 06/09/21 11:10 AM   Result Value Ref Range    Total Protein, Urine 6.0 0.0 - 15.0 mg/dL    Creatinine, Random Urine 24.56 mg/dL    Protein Creatinine Ratio 244 (H) 10 - 107 mg/g   CBC WITH DIFFERENTIAL    Collection Time: 06/09/21 11:22 AM   Result Value Ref Range    WBC 8.9 4.8 - 10.8 K/uL    RBC 4.07 (L) 4.20 - 5.40 M/uL    Hemoglobin 10.6 (L) 12.0 - 16.0 g/dL    Hematocrit 34.6 (L) 37.0 - 47.0 %    MCV 85.0 81.4 - 97.8 fL    MCH 26.0 (L) 27.0 - 33.0 pg    MCHC 30.6 (L) 33.6 - 35.0 g/dL    RDW 46.5 35.9 - 50.0 fL    Platelet Count 288 164 - 446 K/uL    MPV 12.6 9.0 - 12.9 fL    Neutrophils-Polys 73.30 (H) 44.00 - 72.00 %    Lymphocytes 17.20 (L) 22.00 - 41.00 %    Monocytes 7.90 0.00 - 13.40 %    Eosinophils 0.80 0.00 - 6.90 %    Basophils 0.20 0.00 - 1.80 %    Immature Granulocytes 0.60 0.00 - 0.90 %    Nucleated RBC 0.00 /100 WBC    Neutrophils (Absolute) 6.54 2.00 - 7.15 K/uL    Lymphs (Absolute) 1.53 1.00 - 4.80 K/uL    Monos (Absolute) 0.70 0.00 - 0.85 K/uL    Eos (Absolute) 0.07 0.00 - 0.51 K/uL    Baso (Absolute) 0.02 0.00 - 0.12 K/uL    Immature Granulocytes (abs) 0.05 0.00 - 0.11 K/uL    NRBC (Absolute) 0.00 K/uL   Comp Metabolic Panel    Collection Time: 06/09/21 11:22 AM   Result Value Ref Range    Sodium 137 135 - 145 mmol/L    Potassium 3.9 3.6 - 5.5 mmol/L    Chloride 107 96 - 112 mmol/L    Co2 19 (L) 20 - 33 mmol/L    Anion Gap  11.0 7.0 - 16.0    Glucose 70 65 - 99 mg/dL    Bun 3 (L) 8 - 22 mg/dL    Creatinine 0.35 (L) 0.50 - 1.40 mg/dL    Calcium 8.6 8.5 - 10.5 mg/dL    AST(SGOT) 16 12 - 45 U/L    ALT(SGPT) 12 2 - 50 U/L    Alkaline Phosphatase 179 (H) 30 - 99 U/L    Total Bilirubin 0.2 0.1 - 1.5 mg/dL    Albumin 3.4 3.2 - 4.9 g/dL    Total Protein 6.0 6.0 - 8.2 g/dL    Globulin 2.6 1.9 - 3.5 g/dL    A-G Ratio 1.3 g/dL   URIC ACID    Collection Time: 21 11:22 AM   Result Value Ref Range    Uric Acid 4.0 1.9 - 8.2 mg/dL   ESTIMATED GFR    Collection Time: 21 11:22 AM   Result Value Ref Range    GFR If African American >60 >60 mL/min/1.73 m 2    GFR If Non African American >60 >60 mL/min/1.73 m 2   Hold Blood Bank Specimen (Not Tested)    Collection Time: 21 11:22 AM   Result Value Ref Range    Holding Tube - Bb DONE        Assessment:   39w1d  Gest HTN  Pitocin augmentation  May have epidural when desires  anticipate     Estelle Pimentel M.D.

## 2021-06-10 NOTE — PROGRESS NOTES
0700) Report received, assumed care.  Pt comfortable with epidural  0735) IUPC placed by Dr Aleman, no cervical change at this time.  Will continue to increase pitocin until adequate labor achieved  1306) Complete +1  1319) Start pushing  1634)  over 1st degree vaginal lac  1639) spontaneous delivery of intact placenta  1800) Up to bathroom, unable to shower, pericare done, steady gait.   1815) Transferred to PP floor via w/c with infant in arms and family at her side  1830) Report to Vivi GAMA

## 2021-06-10 NOTE — CARE PLAN
The patient is Stable - Low risk of patient condition declining or worsening         Progress made toward(s) clinical / shift goals:  cervical dilation      Patient is not progressing towards the following goals:

## 2021-06-11 VITALS
SYSTOLIC BLOOD PRESSURE: 109 MMHG | HEIGHT: 63 IN | TEMPERATURE: 98.2 F | DIASTOLIC BLOOD PRESSURE: 65 MMHG | WEIGHT: 189 LBS | RESPIRATION RATE: 18 BRPM | BODY MASS INDEX: 33.49 KG/M2 | HEART RATE: 104 BPM | OXYGEN SATURATION: 98 %

## 2021-06-11 LAB
ERYTHROCYTE [DISTWIDTH] IN BLOOD BY AUTOMATED COUNT: 45.9 FL (ref 35.9–50)
HCT VFR BLD AUTO: 29.5 % (ref 37–47)
HGB BLD-MCNC: 9.3 G/DL (ref 12–16)
MCH RBC QN AUTO: 26.5 PG (ref 27–33)
MCHC RBC AUTO-ENTMCNC: 31.5 G/DL (ref 33.6–35)
MCV RBC AUTO: 84 FL (ref 81.4–97.8)
PLATELET # BLD AUTO: 245 K/UL (ref 164–446)
PMV BLD AUTO: 12.6 FL (ref 9–12.9)
RBC # BLD AUTO: 3.51 M/UL (ref 4.2–5.4)
WBC # BLD AUTO: 24.6 K/UL (ref 4.8–10.8)

## 2021-06-11 PROCEDURE — 700102 HCHG RX REV CODE 250 W/ 637 OVERRIDE(OP): Performed by: OBSTETRICS & GYNECOLOGY

## 2021-06-11 PROCEDURE — A9270 NON-COVERED ITEM OR SERVICE: HCPCS | Performed by: OBSTETRICS & GYNECOLOGY

## 2021-06-11 PROCEDURE — 36415 COLL VENOUS BLD VENIPUNCTURE: CPT

## 2021-06-11 PROCEDURE — 85027 COMPLETE CBC AUTOMATED: CPT

## 2021-06-11 RX ORDER — FERROUS SULFATE 325(65) MG
325 TABLET ORAL
Status: DISCONTINUED | OUTPATIENT
Start: 2021-06-11 | End: 2021-06-11 | Stop reason: HOSPADM

## 2021-06-11 RX ADMIN — FERROUS SULFATE TAB 325 MG (65 MG ELEMENTAL FE) 325 MG: 325 (65 FE) TAB at 08:51

## 2021-06-11 ASSESSMENT — EDINBURGH POSTNATAL DEPRESSION SCALE (EPDS)
I HAVE FELT SCARED OR PANICKY FOR NO GOOD REASON: NO, NOT AT ALL
I HAVE LOOKED FORWARD WITH ENJOYMENT TO THINGS: AS MUCH AS I EVER DID
THE THOUGHT OF HARMING MYSELF HAS OCCURRED TO ME: NEVER
I HAVE BEEN SO UNHAPPY THAT I HAVE BEEN CRYING: NO, NEVER
I HAVE BEEN ANXIOUS OR WORRIED FOR NO GOOD REASON: NO, NOT AT ALL
I HAVE BEEN SO UNHAPPY THAT I HAVE HAD DIFFICULTY SLEEPING: NOT AT ALL
I HAVE BLAMED MYSELF UNNECESSARILY WHEN THINGS WENT WRONG: NO, NEVER
I HAVE BEEN ABLE TO LAUGH AND SEE THE FUNNY SIDE OF THINGS: AS MUCH AS I ALWAYS COULD
I HAVE FELT SAD OR MISERABLE: NO, NOT AT ALL
THINGS HAVE BEEN GETTING ON TOP OF ME: NO, I HAVE BEEN COPING AS WELL AS EVER

## 2021-06-11 NOTE — DISCHARGE INSTRUCTIONS
PATIENT DISCHARGE EDUCATION INSTRUCTION SHEET  REASONS TO CALL YOUR OBSTETRICIAN  · Persistent fever, shaking, chills (Temperature higher than 100.4) may indicate you have an infection  · Heavy bleeding: soaking more than 1 pad per hour; Passing clots an egg-sized clot or bigger may mean you have an postpartum hemorrhage  · Foul odor from vagina or bad smelling or discolored discharge or blood  · Breast infection (Mastitis symptoms); breast pain, chills, fever, redness or red streaks, may feel flu like symptoms  · Urinary pain, burning or frequency  · Incision that is not healing, increased redness, swelling, tenderness or pain, or any pus from episiotomy or  site may mean you have an infection  · Redness, swelling, warmth, or painful to touch in the calf area of your leg may mean you have a blood clot  · Severe or intensified depression, thoughts or feelings of wanting to hurt yourself or someone else   · Pain in chest, obstructed breathing or shortness of breath (trouble catching your breath) may mean you are having a postpartum complication. Call your provider immediately   · Headache that does not get better, even after taking medicine, a bad headache with vision changes or pain in the upper right area of your belly may mean you have high blood pressure or post birth preeclampsia. Call your provider immediately    HAND WASHING  All family and friends should wash their hands:  · Before and after holding the baby  · Before feeding the baby  · After using the restroom or changing the baby's diaper    WOUND CARE  Ask your physician for additional care instructions. In general:  ·  Incision:  · May shower and pat incision dry   · Keep the incision clean and dry  · There should not be any opening or pus from the incision  · Continue to walk at home 3 times a day   · Do NOT lift anything heavier than your baby (over 10 pounds)  · Encourage family to help participate in care of the  to allow  rest and mom time to heal  · Episiotomy/Laceration  · May use pricila-spray bottle, witch hazel pads and dermaplast spray for comfort  · Use pricila-spray bottle after urinating to cleanse perineal area  · To prevent burning during urination spray pricila-water bottle on labial area   · Pat perineal area dry until episiotomy/laceration is healed  · Continue to use pricila-bottle until bleeding stops as needed  · If have a 2nd degree laceration or greater, a Sitz bath can offer relief from soreness, burning, and inflammation   · Sitz Bath   · Sit in 6 inches of warm water and soak laceration as needed until the laceration heals    VAGINAL CARE AND BLEEDING  · Nothing inside vagina for 6 weeks:   · No sexual intercourse, tampons or douching  · Bleeding may continue for 2-4 weeks. Amount and color may vary  · Soaking 1 pad or more in an hour for several hours is considered heavy bleeding  · Passing large egg sized blood clots can be concerning  · If you feel like you have heavy bleeding or are having increasing amount of blood clots call your Obstetrician immediately  · If you begin feeling faint upon standing, feeling sick to your stomach, have clammy skin, a really fast heartbeat, have chills, start feeling confused, dizzy, sleepy or weak, or feeling like you're going to faint call your Obstetrician immediately    HYPERTENSION   Preeclampsia or gestational hypertension are types of high blood pressure that only pregnant women can get. It is important for you to be aware of symptoms to seek early intervention and treatment. If you have any of these symptoms immediately call your Obstetrician    · Vision changes or blurred vision   · Severe headache or pain that is unrelieved with medication and will not go away  · Persistent pain in upper abdomen or shoulder   · Increased swelling of face, feet, or hands  · Difficulty breathing or shortness of breath at rest  · Urinating less than usual    URINATION AND BOWEL MOVEMENTS  · Eating  "more fiber (bran cereal, fruits, and vegetables) and drinking plenty of fluids will help to avoid constipation  · Urinary frequency and urgency after childbirth is normal  · If you experience any urinary pain, burning or frequency call your provider    BIRTH CONTROL  · It is possible to become pregnant at any time after delivery and while breastfeeding  · Plan to discuss a method of birth control with your physician at your post delivery follow up visit    POSTPARTUM BLUES  During the first few days after birth, you may experience a sense of the \"blues\" which may include impatience, irritability or even crying. These feelings come and go quickly. However, as many as 1 in 10 women experience emotional symptoms known as postpartum depression.     POSTPARTUM DEPRESSION    May start as early as the second or third day after delivery or take several weeks or months to develop. Symptoms of \"blues\" are present, but are more intense: Crying spells; loss of appetite; feelings of hopelessness or loss of control; fear of touching the baby; over concern or no concern at all about the baby; little or no concern about your own appearance/caring for yourself; and/or inability to sleep or excessive sleeping. Contact your Obstetrician if you are experiencing any of these symptoms     PREVENTING SHAKEN BABY  If you are angry or stressed, PUT THE BABY IN THE CRIB, step away, take some deep breaths, and wait until you are calm to care for the baby. DO NOT SHAKE THE BABY. You are not alone, call a supporter for help.  · [facility] 24/7 crisis call line (420-289-7501) or (1-115.684.8407)  · You can also text them, text \"ANSWER\" (837920)      "

## 2021-06-11 NOTE — CONSULTS
Met with MOB for an initial lactation visit.  MOB delivered her first baby yesterday, 06/10/21, at 1634 at 39.2 weeks gestation.  Risk factor for breastfeeding is: gestational HTN.  MOB reported she is breastfeeding without concern, but asked how to keep infant from moving his head away from the breast when latched and how to keep his hands away from his face and mouth during feeds.  Demonstration performed after visitors stepped out of the room so that breastfeeding assistance could be provided.    Assisted MOB with putting infant to the left breast in the cross cradle position.  Re-iterated proper positioning of infant at the breast and the use of pillows for better support of infant's head and body when breastfeeding.  MOB was shown how to stroke her nipple down infant's nose to chin to illicit a wide mouth response and how using expressed colostrum at the nipple would peak infant's interested with remaining on the breast to feed.  Infant latched deep onto the breast with dimpling observed.  Infant observed to be sucking on his tongue.  Infant repositioned and deep latch achieved with moderate difficulty. No dimpling observed.  MOB denied pain with latch.  Good jaw movement and strong nutritive suck observed.  This LC offered to provide latch assistance at the right breast, but MOB declined.  She stated she feels comfortable latching infant onto the right breast independently.  MOB also informed of the ability to remain one more night in the hospital to work on breastfeeding, but she declined.    MOB reported she is able to perform hand expression independently.    Provided breastfeeding education which included: milk production pacifier/pump use, and breastfeeding resources available to her through WIC (should she qualify for the program) and the [facility] (via Zoom).    RN, Summer Joyce, informed of the lactation assistance provided to MOB during this visit.  RN stated she has also provided MOB with  latch assistance and that MOB was able to perform a return demonstration of what was taught and shown to her.    Breastfeeding Plan:  Offer infant the breast per feeding cues for a minimum of 8 or more feeds in a 24 hour period.  If infant is unable to latch onto the breast between now and when infant turns 24 hours old, MOB should be encouraged to hand express colostrum onto a spoon and feed back her expressed breast milk to infant.    MOB verbalized understanding of all information provided to her and denied having any further lactation questions and/or concerns at this time.  Encouraged MOB to call for lactation assistance as needed.

## 2021-06-11 NOTE — CARE PLAN
Problem: Knowledge Deficit - L&D  Goal: Patient and family/caregivers will demonstrate understanding of plan of care, disease process/condition, diagnostic tests and medications  6/10/2021 1845 by Komal Ceballos R.N.  Outcome: Met  6/10/2021 0943 by Komal Ceballos R.N.  Outcome: Progressing  6/10/2021 0943 by Komal Ceballos R.N.  Outcome: Progressing     Problem: Psychosocial - L&D  Goal: Patient's level of anxiety will decrease  6/10/2021 1845 by Komal Ceballos R.N.  Outcome: Met  6/10/2021 0943 by Komal Ceballos R.N.  Outcome: Progressing  6/10/2021 0943 by Komal Ceballos R.N.  Outcome: Progressing  Goal: Patient will be able to discuss coping skills during hospitalization  6/10/2021 1845 by Komal Ceballos R.N.  Outcome: Met  6/10/2021 0943 by Komal Ceballos R.N.  Outcome: Progressing  6/10/2021 0943 by Komal Ceballos R.N.  Outcome: Progressing  Goal: Patient's ability to re-evaluate and adapt role responsibilities will improve  6/10/2021 1845 by Komal Ceballos R.N.  Outcome: Met  6/10/2021 0943 by Komal Ceballos R.N.  Outcome: Progressing  6/10/2021 0943 by Komal Ceballos R.N.  Outcome: Progressing  Goal: Spiritual and cultural needs incorporated into hospitalization  6/10/2021 1845 by Komal Ceballos R.N.  Outcome: Met  6/10/2021 0943 by Komal Ceballos R.N.  Outcome: Progressing  6/10/2021 0943 by Komal Ceballos R.N.  Outcome: Progressing     Problem: Risk for Venous Thromboembolism (VTE)  Goal: VTE prevention measures will be implemented and patient will remain free from VTE  6/10/2021 1845 by Komal Ceballos R.N.  Outcome: Met  6/10/2021 0943 by Komal Ceballos R.N.  Outcome: Progressing  6/10/2021 0943 by Komal Ceballos R.N.  Outcome: Progressing     Problem: Risk for Infection and Impaired Wound Healing  Goal: Patient will remain free from infection  6/10/2021 1845 by Komal Ceballos R.N.  Outcome: Met  6/10/2021 0943 by Komal CABRERA  NORM Ceballos  Outcome: Progressing  6/10/2021 0943 by Komal Ceballos R.N.  Outcome: Progressing  Goal: Patient's wound/surgical incision will decrease in size and heals properly  6/10/2021 1845 by Komal Ceballos R.N.  Outcome: Met  6/10/2021 0943 by Komal Ceballos R.N.  Outcome: Progressing  6/10/2021 0943 by Komal Ceballos R.N.  Outcome: Progressing     Problem: Risk for Fluid Imbalance  Goal: Patient's fluid volume balance will be maintained or improve  6/10/2021 1845 by Komal Ceballos R.N.  Outcome: Met  6/10/2021 0943 by Komal Ceballos R.N.  Outcome: Progressing  6/10/2021 0943 by Komal Ceballos R.N.  Outcome: Progressing     Problem: Risk for Injury  Goal: Patient and fetus will be free of preventable injury/complications  6/10/2021 1845 by Komal Ceballos R.N.  Outcome: Met  6/10/2021 0943 by Komal Ceballos R.N.  Outcome: Progressing  6/10/2021 0943 by Komal Ceballos R.N.  Outcome: Progressing     Problem: Pain  Goal: Patient's pain will be alleviated or reduced to the patient’s comfort goal  6/10/2021 1845 by Komal Ceballos R.N.  Outcome: Met  6/10/2021 0943 by Komal Ceballos R.N.  Outcome: Progressing  6/10/2021 0943 by Komal Ceballos R.N.  Outcome: Progressing     Problem: Discharge Barriers/Planning  Goal: Patient's continuum of care needs are met  6/10/2021 1845 by Komal Ceballos R.N.  Outcome: Met  6/10/2021 0943 by Komal Ceballos R.N.  Outcome: Progressing  6/10/2021 0943 by Komal Ceballos R.N.  Outcome: Progressing     Problem: Knowledge Deficit - Standard  Goal: Patient and family/care givers will demonstrate understanding of plan of care, disease process/condition, diagnostic tests and medications  Outcome: Met   The patient is Stable - Low risk of patient condition declining or worsening         Progress made toward(s) clinical / shift goals:      Patient is not progressing towards the following goals:

## 2021-06-11 NOTE — ANESTHESIA TIME REPORT
Anesthesia Start and Stop Event Times     Date Time Event    6/10/2021 0529 Ready for Procedure     0540 Anesthesia Start     1634 Anesthesia Stop        Responsible Staff  06/10/21    Name Role Begin End    Paolo Knox D.O. Anesth 0540 0705    Min KAIT Hummel M.D. Anesth 0705 1634        Preop Diagnosis (Free Text):  Pre-op Diagnosis             Preop Diagnosis (Codes):    Post op Diagnosis  Pregnancy      Premium Reason  A. 3PM - 7AM    Comments:

## 2021-06-11 NOTE — ANESTHESIA POSTPROCEDURE EVALUATION
Patient: Kathia Zarco    Procedure Summary     Date: 06/10/21 Room / Location:     Anesthesia Start: 0540 Anesthesia Stop: 1634    Procedure: Labor Epidural Diagnosis:     Scheduled Providers:  Responsible Provider: Dequan Hummel M.D.    Anesthesia Type: epidural ASA Status: 2          Final Anesthesia Type: epidural  Last vitals  BP   Blood Pressure: 129/77    Temp   36.4 °C (97.6 °F)    Pulse   (!) 104   Resp   (!) 24    SpO2   96 %      Anesthesia Post Evaluation    Patient location during evaluation: bedside  Patient participation: complete - patient participated  Level of consciousness: awake and alert  Pain score: 0    Airway patency: patent  Anesthetic complications: no  Cardiovascular status: hemodynamically stable  Respiratory status: acceptable  Hydration status: euvolemic    PONV: none    patient able to participate, but full recovery from regional anesthesia has not occurred and is not expected within the stipulated timeframe for the completion of the evaluation      No complications documented.     Nurse Pain Score: 0 (NPRS)

## 2021-06-11 NOTE — PROGRESS NOTES
2030 Pt doing well bonding well with baby, Assessment done denies pain at this time, Encourage to call for assistance, Needs attended.

## 2021-06-11 NOTE — PROGRESS NOTES
Bedside report received from NATAN Sauceda. Care assumed. Shift chart check complete. Orders reviewed.

## 2021-06-11 NOTE — PROGRESS NOTES
"PPD#1    S: Doing well. No concerns. Denies HA/RUQ pain/scotoma. Lochia normal. Declines pain medication    O: /72   Pulse (!) 105   Temp 36.8 °C (98.2 °F) (Temporal)   Resp 18   Ht 1.6 m (5' 3\")   Wt 85.7 kg (189 lb)   SpO2 96%   Gen: NAD  Fundus firm below umbilicus  Ext: no c/c/e    Labs:   Hgb 10.6-->9.3  A pos, RI, GBS neg    A/P 20yo  s/p  @ 39w, GHTN  1. Routine pp care  2. GHTN -No current s/sx of PreE    -BP at goal of <150/90. Not requiring medication currently   3. Anemia -Present on admission    -Fe def worsened by acute blood loss    -Asymtomatic    -Start Fe  4. Home tomorrow   "

## 2021-06-11 NOTE — CARE PLAN
The patient is Stable - Low risk of patient condition declining or worsening    Shift Goals  Clinical Goals: stable vitals  Patient Goals: breastfeeding    Progress made toward(s) clinical / shift goals:  patient has had stable vitals. Her heart rate remains high.    Patient is not progressing towards the following goals:
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
## 2021-06-11 NOTE — DISCHARGE SUMMARY
DATE OF ADMISSION:  2021   DATE OF DISCHARGE:  2021     DIAGNOSES ON ADMISSION:    1.  A 21-year-old  1, para 0 at 39 weeks.  2.  Gestational hypertension.     PROCEDURE:  Normal spontaneous vaginal delivery.     POSTOPERATIVE DIAGNOSES:    1.  A 21-year-old  1, para 0 at 39 weeks.  2.  Gestational hypertension.     HOSPITAL COURSE:  This is a 21-year-old  1, para 0 who presented at 39   weeks with gestational hypertension.  She underwent induction of labor.  She   had an uncomplicated vaginal delivery of a baby boy on 06/10/2021.  Apgars   were 8 and 9.  Baby was named Ruben.  He weighed 8 pounds 9.4 ounces.  She   had a small vaginal laceration, which was repaired.     Postpartum, she has done well.  She has remained afebrile with stable vital   signs.  Fundus is firm below the umbilicus.  Pain is well controlled.  Lochia   is normal.  She is ambulating, voiding without difficulty.  Blood pressures on   the day of discharge have ranged from 108-124/64-85.  She has no clinical   signs or symptoms of preeclampsia.     DISCHARGE MEDICATIONS:  Declined by the patient.  She plans to take   over-the-counter Motrin as needed.     DISCHARGE INSTRUCTIONS:    1.  The patient is given routine precautions for bleeding, pain, infection,   VTE, difficulty breastfeeding, postpartum depression.  2.  The patient will follow up with Dr. Estes in one week for blood pressure   check.        ______________________________  ADELSO SANTOS, MD HUGGINS/MIGLE    DD:  2021 08:33  DT:  2021 10:09    Job#:  [id number]

## 2021-06-11 NOTE — PROGRESS NOTES
"Patient started on ferrous sulfate every morning. This is not a new medication for this patient. Per patient she has been on ferrous sulfate before. Patient was aware of reason for taking ferrous sulfate and the side effects.  This patient does not meet the \"M\" on the board needs.   "

## 2021-06-11 NOTE — L&D DELIVERY NOTE
DATE OF SERVICE:  06/10/2021     PREOPERATIVE DIAGNOSES:  1.  A 21-year-old  1, para 0 at 39 weeks and 2 days.  2.  Gestational hypertension.     PROCEDURES:  1.  Normal spontaneous vaginal delivery.  2.  Repair of vaginal laceration.     POSTOPERATIVE DIAGNOSES:  1.  A 21-year-old  1, para 0 at 39 weeks and 2 days.  2.  Gestational hypertension.     PRIMARY OBSTETRICIAN:  Brenda Estes MD     DELIVERING OBSTETRICIAN:  Celia Aleman MD     HOSPITAL COURSE:  The patient is a 21-year-old  1, para 0, who was   referred from the office for elevated blood pressures.  She was found to have   gestational hypertension.  Underwent induction of labor.  She received an   epidural and progressed to complete.  She pushed for approximately 3-1/2 hours   to deliver a baby boy OA over an intact perineum.  There were transverse   shoulders and the left slightly more anterior shoulder was rotated posteriorly   to allow delivery of the shoulder.  There was not a shoulder dystocia.  Baby   was placed on maternal stomach where he was immediately vigorous.  Cord was   allowed to pulsate for 1 minute, at which point it was clamped and cut.    Pitocin was initiated.  The placenta delivered easily and intact under gentle   manual traction.  Her fundus was firm and in the midline.  She had a small   vaginal laceration that did not extend to the perineum.  This was repaired in   the usual fashion using 3-0 Vicryl.     FINDINGS:  1.  Baby boy at 1634, Apgars 8 and 9, weight currently pending.  2.  Normal placenta with 3-vessel cord.  3.  Clear amniotic fluid.     COUNTS:  Correct.     COMPLICATIONS:  None apparent.     ESTIMATED BLOOD LOSS:  300 mL.     DISPOSITION:  Stable for routine postpartum care.        ______________________________  MD BHAVNA GEIGER/SHELTON/LENARD    DD:  06/10/2021 16:50  DT:  06/10/2021 17:55    Job#:  [id number]

## 2021-06-12 NOTE — PROGRESS NOTES
1630 Discharge instructions reviewed, verbalized understanding, papers signed.  1744 Left facility escorted by staff.

## 2021-08-11 ENCOUNTER — APPOINTMENT (RX ONLY)
Dept: URBAN - METROPOLITAN AREA CLINIC 4 | Facility: CLINIC | Age: 21
Setting detail: DERMATOLOGY
End: 2021-08-11

## 2021-08-11 DIAGNOSIS — D22 MELANOCYTIC NEVI: ICD-10-CM

## 2021-08-11 DIAGNOSIS — Z71.89 OTHER SPECIFIED COUNSELING: ICD-10-CM

## 2021-08-11 DIAGNOSIS — L81.4 OTHER MELANIN HYPERPIGMENTATION: ICD-10-CM

## 2021-08-11 DIAGNOSIS — D18.0 HEMANGIOMA: ICD-10-CM

## 2021-08-11 DIAGNOSIS — L81.3 CAFÉ AU LAIT SPOTS: ICD-10-CM

## 2021-08-11 PROBLEM — D48.5 NEOPLASM OF UNCERTAIN BEHAVIOR OF SKIN: Status: ACTIVE | Noted: 2021-08-11

## 2021-08-11 PROBLEM — D18.01 HEMANGIOMA OF SKIN AND SUBCUTANEOUS TISSUE: Status: ACTIVE | Noted: 2021-08-11

## 2021-08-11 PROBLEM — D22.72 MELANOCYTIC NEVI OF LEFT LOWER LIMB, INCLUDING HIP: Status: ACTIVE | Noted: 2021-08-11

## 2021-08-11 PROBLEM — D22.5 MELANOCYTIC NEVI OF TRUNK: Status: ACTIVE | Noted: 2021-08-11

## 2021-08-11 PROCEDURE — ? COUNSELING

## 2021-08-11 PROCEDURE — 11102 TANGNTL BX SKIN SINGLE LES: CPT

## 2021-08-11 PROCEDURE — 99204 OFFICE O/P NEW MOD 45 MIN: CPT | Mod: 57

## 2021-08-11 PROCEDURE — ? SURGICAL DECISION MAKING

## 2021-08-11 PROCEDURE — ? BIOPSY BY SHAVE METHOD

## 2021-08-11 PROCEDURE — ? SEPARATE AND IDENTIFIABLE DOCUMENTATION

## 2021-08-11 ASSESSMENT — LOCATION SIMPLE DESCRIPTION DERM
LOCATION SIMPLE: LEFT UPPER ARM
LOCATION SIMPLE: RIGHT UPPER BACK
LOCATION SIMPLE: RIGHT UPPER ARM
LOCATION SIMPLE: ABDOMEN
LOCATION SIMPLE: LEFT THIGH
LOCATION SIMPLE: LOWER BACK

## 2021-08-11 ASSESSMENT — LOCATION DETAILED DESCRIPTION DERM
LOCATION DETAILED: LEFT ANTERIOR PROXIMAL THIGH
LOCATION DETAILED: LEFT ANTERIOR PROXIMAL UPPER ARM
LOCATION DETAILED: SUPERIOR LUMBAR SPINE
LOCATION DETAILED: RIGHT SUPERIOR MEDIAL UPPER BACK
LOCATION DETAILED: PERIUMBILICAL SKIN
LOCATION DETAILED: RIGHT ANTERIOR PROXIMAL UPPER ARM
LOCATION DETAILED: LEFT ANTERIOR DISTAL THIGH
LOCATION DETAILED: RIGHT SUPERIOR UPPER BACK

## 2021-08-11 ASSESSMENT — LOCATION ZONE DERM
LOCATION ZONE: LEG
LOCATION ZONE: TRUNK
LOCATION ZONE: ARM

## 2021-08-11 NOTE — PROCEDURE: BIOPSY BY SHAVE METHOD
Detail Level: Detailed
Depth Of Biopsy: dermis
Was A Bandage Applied: Yes
Size Of Lesion In Cm: 0
Biopsy Type: H and E
Biopsy Method: Dermablade
Anesthesia Type: 1% lidocaine with epinephrine
Anesthesia Volume In Cc: 0.5
Hemostasis: Drysol
Wound Care: Petrolatum
Dressing: bandage
Destruction After The Procedure: No
Type Of Destruction Used: Curettage
Curettage Text: The wound bed was treated with curettage after the biopsy was performed.
Cryotherapy Text: The wound bed was treated with cryotherapy after the biopsy was performed.
Electrodesiccation Text: The wound bed was treated with electrodesiccation after the biopsy was performed.
Electrodesiccation And Curettage Text: The wound bed was treated with electrodesiccation and curettage after the biopsy was performed.
Silver Nitrate Text: The wound bed was treated with silver nitrate after the biopsy was performed.
Lab: [id number]
Lab Facility: 
Path Notes (To The Dermatopathologist): No photo obtained.
Consent: Written consent was obtained and risks were reviewed including but not limited to scarring, infection, bleeding, scabbing, incomplete removal, nerve damage and allergy to anesthesia.
Post-Care Instructions: I reviewed with the patient in detail post-care instructions. Patient is to keep the biopsy site dry overnight, and then apply bacitracin twice daily until healed. Patient may apply hydrogen peroxide soaks to remove any crusting.
Notification Instructions: Patient will be notified of biopsy results. However, patient instructed to call the office if not contacted within 2 weeks.
Billing Type: Third-Party Bill
Information: Selecting Yes will display possible errors in your note based on the variables you have selected. This validation is only offered as a suggestion for you. PLEASE NOTE THAT THE VALIDATION TEXT WILL BE REMOVED WHEN YOU FINALIZE YOUR NOTE. IF YOU WANT TO FAX A PRELIMINARY NOTE YOU WILL NEED TO TOGGLE THIS TO 'NO' IF YOU DO NOT WANT IT IN YOUR FAXED NOTE.

## 2021-08-11 NOTE — PROCEDURE: SURGICAL DECISION MAKING
Date Of Surgery - Today Or Tomorrow?: today
Identified Risk Factors Documented?: yes
Risk Assessment Explanation (Does Not Render In The Note): Clinical determination of the probability and/or consequences of an event, such as surgery. Clinical assessment of the level of risk is affected by the nature of the event under consideration for the patient. Modifier 57 is used to indicate an Evaluation and Management (E/M) service resulted in the initial decision to perform surgery either the day before a major surgery (90 day global) or the day of a major surgery.
Discussion: Bx vs treat
Complexity (Necessary For Coding; Major - 90 Day Global With Some Exceptions; Minor - 10 Day Global): major

## 2023-07-13 NOTE — H&P
DATE OF ADMISSION:  2021     HISTORY OF PRESENT ILLNESS:  The patient is a private patient of Dr. Brenda Estes.  She is a 21-year-old  1, para 0 at 39 and 1/7th weeks'   gestation, EDC of 06/15/2021, who presents to labor and delivery after being   seen in the office and had two elevated blood pressures.  Her blood pressure   in the office, the first one was 126/92 and on repeat was 136/100.  She also   has a complaint of persistent headache; therefore sent to labor and delivery   to rule out preeclampsia. In labor and delivery, she did have 1 elevated blood   pressure with a diastolic of 90.  Her headache was 5/10.  She had not taken   anything for it.  She was given Tylenol, which did improve it.  Labs did   return, which were all essentially normal.  She did have some proteinuria and   her protein/creatinine ratio 244, her platelets were normal, liver function   was normal and her creatinine was also normal at 0.35. Uric acid 4.0.    Decision made to proceed with induction of labor at this time secondary to the   gestational hypertension with elevated blood pressures in the office as well   as one here and persistent headache.     PAST MEDICAL HISTORY:  Noncontributory.     PAST SURGICAL HISTORY: Labioplasty and pelviscopy with lysis of adhesions.     OBSTETRICAL HISTORY:  .     GYNECOLOGIC HISTORY:  Her LMP was 2020.  Denies any abnormal Paps or   sexually transmitted diseases including no history of herpes simplex virus.     SOCIAL HISTORY:  She is single.  Father of the baby is Sam. No alcohol, tobacco or history of drug use.     FAMILY HISTORY:  Noncontributory.     ALLERGIES:  No known drug allergies.     CURRENT MEDICATIONS:  Include only prenatal vitamins.     PHYSICAL EXAMINATION:  VITAL SIGNS:  In labor and delivery mostly ranging in 120s-130s/mid 80s, but   again she did have one elevated blood pressure of 136/90 and two elevated   blood pressures in the office.  She is  afebrile.  CARDIOVASCULAR:  Regular rate and rhythm.  CHEST:  Clear to auscultation bilaterally.  ABDOMEN:  Gravid, nontender, nondistended, normal bowel sounds.  EXTREMITIES:  1+ pitting edema bilateral lower extremities.  PELVIC:  Sterile vaginal exam:  She is 1 cm, 70% effaced, -2 station.  Fetal   heart rate tracing category 1 reactive, reassuring, good variability, no   decelerations with contractions about every 2-5 minutes.     LABORATORY DATA:  Again, CBC is within normal limits with normal platelets,   normal chemistry panel.  She is A positive. Antibody screen negative. RPR   nonreactive. Rubella is immune.  Hepatitis B surface antigen negative.  HIV is   nonreactive.  Group B strep is negative. Her 1-hour glucose was 112.     ASSESSMENT AND PLAN:  A 21-year-old  1, para 0 at 39 and 1/7th weeks'   gestation.     Gestational hypertension. No evidence of preeclampsia at this time.  Her   headache did improve with Tylenol and her blood pressures are mostly normal.    We will move towards delivery at this time.  She is aubrey. We will plan   to IV hydrate her up as she has not eaten or drank anything.  If her   contractions quiesce, we will plan for Cytotec cervical ripening followed by   Pitocin augmentation, anticipate spontaneous vaginal delivery.  Fetal tracings   are reassuring at this time.  Group B strep negative.        ______________________________  MD PRESLEY GARCIA/SHAWNA/JOCE    DD:  2021 14:19  DT:  2021 15:23    Job#:  [id number]     Calcipotriene Counseling:  I discussed with the patient the risks of calcipotriene including but not limited to erythema, scaling, itching, and irritation.